# Patient Record
Sex: FEMALE | Race: WHITE | Employment: OTHER | URBAN - METROPOLITAN AREA
[De-identification: names, ages, dates, MRNs, and addresses within clinical notes are randomized per-mention and may not be internally consistent; named-entity substitution may affect disease eponyms.]

---

## 2017-01-11 RX ORDER — DONEPEZIL HYDROCHLORIDE 5 MG/1
TABLET, FILM COATED ORAL
Qty: 30 TAB | Refills: 0 | Status: SHIPPED | OUTPATIENT
Start: 2017-01-11 | End: 2017-03-01 | Stop reason: SDUPTHER

## 2017-03-01 RX ORDER — DONEPEZIL HYDROCHLORIDE 5 MG/1
TABLET, FILM COATED ORAL
Qty: 30 TAB | Refills: 0 | Status: SHIPPED | OUTPATIENT
Start: 2017-03-01 | End: 2017-03-29 | Stop reason: SDUPTHER

## 2017-03-30 RX ORDER — DONEPEZIL HYDROCHLORIDE 5 MG/1
TABLET, FILM COATED ORAL
Qty: 30 TAB | Refills: 0 | Status: SHIPPED | OUTPATIENT
Start: 2017-03-30 | End: 2017-04-21 | Stop reason: ALTCHOICE

## 2017-04-21 ENCOUNTER — OFFICE VISIT (OUTPATIENT)
Dept: NEUROLOGY | Age: 82
End: 2017-04-21

## 2017-04-21 ENCOUNTER — TELEPHONE (OUTPATIENT)
Dept: NEUROLOGY | Age: 82
End: 2017-04-21

## 2017-04-21 VITALS
HEART RATE: 50 BPM | RESPIRATION RATE: 16 BRPM | DIASTOLIC BLOOD PRESSURE: 58 MMHG | HEIGHT: 58 IN | WEIGHT: 110.5 LBS | OXYGEN SATURATION: 97 % | SYSTOLIC BLOOD PRESSURE: 138 MMHG | BODY MASS INDEX: 23.19 KG/M2 | TEMPERATURE: 98.3 F

## 2017-04-21 DIAGNOSIS — N19 RENAL FAILURE: ICD-10-CM

## 2017-04-21 DIAGNOSIS — G30.1 LATE ONSET ALZHEIMER'S DISEASE WITHOUT BEHAVIORAL DISTURBANCE (HCC): Primary | ICD-10-CM

## 2017-04-21 DIAGNOSIS — F02.80 LATE ONSET ALZHEIMER'S DISEASE WITHOUT BEHAVIORAL DISTURBANCE (HCC): Primary | ICD-10-CM

## 2017-04-21 NOTE — MR AVS SNAPSHOT
Visit Information Date & Time Provider Department Dept. Phone Encounter #  
 4/21/2017  3:00 PM Real Mariano NP Neurology sha De La Yangmary ellenie [facility] [phone] [id number] Follow-up Instructions Return in about 4 months (around 8/21/2017). Upcoming Health Maintenance Date Due DTaP/Tdap/Td series (1 - Tdap) 7/5/1955 ZOSTER VACCINE AGE 60> 7/5/1994 GLAUCOMA SCREENING Q2Y 7/5/1999 OSTEOPOROSIS SCREENING (DEXA) 7/5/1999 MEDICARE YEARLY EXAM 7/5/1999 Pneumococcal 65+ High/Highest Risk (2 of 2 - PCV13) 2/11/2014 INFLUENZA AGE 9 TO ADULT 8/1/2016 Allergies as of 4/21/2017  Review Complete On: 4/21/2017 By: Real Mariano NP Severity Noted Reaction Type Reactions Bactrim [Sulfamethoprim Ds]  10/08/2015    Other (comments) Acute Renal Failure Keflex [Cephalexin]  09/24/2011    Hives, Itching Penicillin G  08/23/2015    Rash Current Immunizations  Reviewed on 2/12/2013 Name Date Influenza Vaccine (Quad) PF 10/13/2015 10:27 AM  
 Influenza Vaccine PF 2/11/2013  5:31 PM  
 Pneumococcal Polysaccharide (PPSV-23) 2/11/2013  5:31 PM  
  
 Not reviewed this visit Vitals BP Pulse Temp Resp Height(growth percentile) Weight(growth percentile) 138/58 (!) 50 98.3 °F (36.8 °C) (Oral) 16 4' 10\" (1.473 m) 110 lb 8 oz (50.1 kg) SpO2 BMI OB Status Smoking Status 97% 23.09 kg/m2 Hysterectomy Former Smoker Vitals History BMI and BSA Data Body Mass Index Body Surface Area 23.09 kg/m 2 1.43 m 2 Preferred Pharmacy Pharmacy Name Phone [facility] [address], [address] [facility] [place] [phone] Your Updated Medication List  
  
   
This list is accurate as of: 4/21/17  4:16 PM.  Always use your most recent med list.  
  
  
  
  
 atenolol-chlorthalidone 50-25 mg per tablet Commonly known as:  Colletta Gouty Take 1 Tab by mouth daily. donepezil 10 mg tablet Commonly known as:  ARICEPT  
 Take 1 Tab by mouth nightly. REFRESH LIQUIGEL 1 % Dlgl Generic drug:  carboxymethylcellulose sodium Administer 1 Drop to both eyes as needed. Follow-up Instructions Return in about 4 months (around 8/21/2017). Patient Instructions It is not likely that the Namzaric is causing her to have UTI and while the increase in confusion can be due to the medications, the UTI could also very easily cause this issue as well. With her renal function, we will not restart the Namzaric. It is not uncommon for patients with dementia to think that they are not in their own home because they cannot remember that they moved from a previous home. Introducing [facility] & HEALTH SERVICES! Dear Donald Matters: Thank you for requesting a Vitals [email or web address]) account. Our records indicate that you already have an active Vitals [email or web address]) account. You can access your account anytime at [email or web address] Did you know that you can access your hospital and ER discharge instructions at any time in Vitals [email or web address])? You can also review all of your test results from your hospital stay or ER visit. Additional Information If you have questions, please visit the Frequently Asked Questions section of the Vitals [email or web address]) website at [email or web address] Remember, Vitals ([email or web address]) is NOT to be used for urgent needs. For medical emergencies, dial 911. Now available from your iPhone and Android! Please provide this summary of care documentation to your next provider. Your primary care clinician is listed as [facility]. If you have any questions after today's visit, please call [phone].

## 2017-04-21 NOTE — PROGRESS NOTES
Date:  2017    Name:  Dede Hidalgo  :  1934  MRN:  [id number]     PCP:  Carrol Brown MD    Chief Complaint   Patient presents with    Dementia     poor sleep routine/fair appetite/keeping busy bwith chores and her bird     HISTORY OF PRESENT ILLNESS: Follow up for dementia. She is accompanied by her daughter in law who provides history. Her daughter Andrews Baldwin also sent a message expressing the following concerns. *patient is hiding things in the home and packing her belongings in paperbags. *patient insists that she is not in her home, but wants to go back there. *patient is currently on only Aricept due to an increase in UTIs and agitation on Namzaric. Patient's daughter is not willing to try Namzaric again at this time. Repeating or asking the same thing over and over? Yes   Forgetting appointments, family occasions, holidays? Yes   Needs help managing finances, help shopping, help taking medications correctly? Family helps to do all of these things  Getting lost in familiar places? Does not go out alone and does not drive  Need help with ADLs? Yes, mainly supervision and direction    Unsafe behaviors: Aggression, Wandering-none    Answers provided by:  Family member: grandson's fiance  Other Grandson's fiance's mother       Current Outpatient Prescriptions   Medication Sig    donepezil (ARICEPT) 10 mg tablet Take 1 Tab by mouth nightly.  atenolol-chlorthalidone (TENORETIC) 50-25 mg per tablet Take 1 Tab by mouth daily.  carboxymethylcellulose sodium (REFRESH LIQUIGEL) 1 % dlgl Administer 1 Drop to both eyes as needed. No current facility-administered medications for this visit.       Allergies   Allergen Reactions    Bactrim [Sulfamethoprim Ds] Other (comments)     Acute Renal Failure    Keflex [Cephalexin] Hives and Itching    Penicillin G Rash     Past Medical History:   Diagnosis Date    Anxiety     Arthritis     Asthma     as a child    Depression     Fatigue     GERD (gastroesophageal reflux disease)     Glaucoma     Hypertension     Meniere's disease     Numbness of fingers of both hands     Slurred speech     1/10/13-went to ED    Snoring     Vertigo      Past Surgical History:   Procedure Laterality Date    HX HEENT      esophagus widened with endoscopic    HX HEENT      bilateral cataract removal with lens implants    HX HEENT      glaucoma procedures both eyes    HX HYSTERECTOMY      2010     Social History     Social History    Marital status:      Spouse name: N/A    Number of children: N/A    Years of education: N/A     Occupational History    Not on file. Social History Main Topics    Smoking status: Former Smoker     Quit date: 1/1/1989    Smokeless tobacco: Not on file    Alcohol use No    Drug use: No    Sexual activity: Not Currently     Other Topics Concern    Not on file     Social History Narrative     Family History   Problem Relation Age of Onset    Dementia Mother     Hypertension Father     Parkinson's Disease Maternal Grandmother        PHYSICAL EXAMINATION:    Visit Vitals    /58    Pulse (!) 50    Temp 98.3 °F (36.8 °C) (Oral)    Resp 16    Ht 4' 10\" (1.473 m)    Wt 50.1 kg (110 lb 8 oz)    SpO2 97%    BMI 23.09 kg/m2     General: Well defined, nourished, and groomed individual in no acute distress. Neck: Supple, nontender, no bruits, no pain with resistance to active range of motion. Heart: Regular rate and rhythm, no murmurs, rub, or gallop. Normal S1S2. Lungs: Clear to auscultation bilaterally with equal chest expansion, no cough, no wheeze  Musculoskeletal: Extremities revealed no edema and had full range of motion of joints. Psych: Good mood and bright affect     NEUROLOGICAL EXAMINATION:   Mental Status: Alert and oriented to person and place. She is appropriate and able to discuss some of her medical conditions.  However, she does have some decrease in insight.       Cranial Nerves:   II, III, IV, VI: Visual acuity grossly intact. Visual fields are normal.   Pupils are equal, round, and reactive to light and accommodation. Extra-ocular movements are full and fluid. Fundoscopic exam was benign, no ptosis or nystagmus. V-XII: Hearing is grossly intact. Facial features are symmetric, with normal sensation and strength. The palate rises symmetrically and the tongue protrudes midline. Sternocleidomastoids 5/5.      Motor Examination: Normal tone, bulk, and strength, 5/5 muscle strength throughout.      Coordination: Finger to nose was normal. No resting or intention tremor     Gait and Station: Steady while walking. Normal arm swing. No pronator drift. No muscle wasting or fasiculations noted.      Reflexes: DTRs 2+ throughout. ASSESSMENT AND PLAN    ICD-10-CM ICD-9-CM    1. Late onset Alzheimer's disease without behavioral disturbance G30.1 331.0     F02.80 294.10    2. Renal failure N19 586      Some of the issues with putting things away, packing her items, and wanting to go home because she does not always recognize where she currently lives as her home, is consistent with an advancing dementia process. I discussed the diagnosis, prognosis, and progression with the caregivers who brought her today and recommended reorientation if possible followed by redirection if not. We also discussed the preference for dual therapy for maintenance but given her kidney issues will hold off on the Namenda as this is not recommended in severe renal impairment. She will continue with the Aricept. Encouraged them to try to keep her as mentally and physically active as possible. In discussing her disease progression, we did discuss the reality that leaving her at home alone at night will become unreasonable. She is already displaying some confusion in that she has been known to call her grandson in the middle of the night. Apparently, this is a discussion that the family has started to have.  Follow up in four months    [address] [facility].  Karen Thompson

## 2017-04-21 NOTE — TELEPHONE ENCOUNTER
Patient's daughter calls to report that the patient will be brought to her appointment today by her future daughter in law. Daughter, Clarisa Curtis, verbalizes some new concerns:  *patient is hiding things in the home and packing her belongings in paperbags. *patient insists that she is not in her home, but wants to go back there. *patient is currently on only Aricept due to an increase in UTIs and agitation on Namzaric. Patient's daughter is not willing to try Namzaric again at this time.

## 2017-04-21 NOTE — PATIENT INSTRUCTIONS
It is not likely that the Namzaric is causing her to have UTI and while the increase in confusion can be due to the medications, the UTI could also very easily cause this issue as well. With her renal function, we will not restart the Namzaric. It is not uncommon for patients with dementia to think that they are not in their own home because they cannot remember that they moved from a previous home.

## 2017-04-21 NOTE — TELEPHONE ENCOUNTER
Laura Enamorado requesting call back from you before pt appt ricky please #[phone] ex [phone].  Thank you

## 2017-04-25 ENCOUNTER — HOSPITAL ENCOUNTER (EMERGENCY)
Age: 82
Discharge: HOME | End: 2017-04-25
Attending: EMERGENCY MEDICINE
Payer: MEDICARE

## 2017-04-25 VITALS
HEART RATE: 54 BPM | DIASTOLIC BLOOD PRESSURE: 72 MMHG | HEIGHT: 59 IN | OXYGEN SATURATION: 95 % | TEMPERATURE: 98.5 F | RESPIRATION RATE: 18 BRPM | SYSTOLIC BLOOD PRESSURE: 150 MMHG | BODY MASS INDEX: 22.58 KG/M2 | WEIGHT: 112 LBS

## 2017-04-25 DIAGNOSIS — M79.89 LEFT LEG SWELLING: Primary | ICD-10-CM

## 2017-04-25 DIAGNOSIS — E86.0 DEHYDRATION: ICD-10-CM

## 2017-04-25 DIAGNOSIS — N30.00 ACUTE CYSTITIS WITHOUT HEMATURIA: ICD-10-CM

## 2017-04-25 DIAGNOSIS — N28.9 RENAL INSUFFICIENCY: ICD-10-CM

## 2017-04-25 LAB
ALBUMIN SERPL BCP-MCNC: 3.2 G/DL (ref 3.5–5)
ALBUMIN/GLOB SERPL: 0.7 {RATIO} (ref 1.1–2.2)
ALP SERPL-CCNC: 82 U/L (ref 45–117)
ALT SERPL-CCNC: 15 U/L (ref 12–78)
ANION GAP BLD CALC-SCNC: 7 MMOL/L (ref 5–15)
APPEARANCE UR: ABNORMAL
AST SERPL W P-5'-P-CCNC: 9 U/L (ref 15–37)
BACTERIA URNS QL MICRO: ABNORMAL /HPF
BASOPHILS # BLD AUTO: 0 K/UL (ref 0–0.1)
BASOPHILS # BLD: 0 % (ref 0–1)
BILIRUB SERPL-MCNC: 0.5 MG/DL (ref 0.2–1)
BILIRUB UR QL: NEGATIVE
BUN SERPL-MCNC: 46 MG/DL (ref 6–20)
BUN/CREAT SERPL: 24 (ref 12–20)
CALCIUM SERPL-MCNC: 9.6 MG/DL (ref 8.5–10.1)
CHLORIDE SERPL-SCNC: 108 MMOL/L (ref 97–108)
CO2 SERPL-SCNC: 29 MMOL/L (ref 21–32)
COLOR UR: ABNORMAL
CREAT SERPL-MCNC: 1.9 MG/DL (ref 0.55–1.02)
EOSINOPHIL # BLD: 0.3 K/UL (ref 0–0.4)
EOSINOPHIL NFR BLD: 3 % (ref 0–7)
EPITH CASTS URNS QL MICRO: ABNORMAL /LPF
ERYTHROCYTE [DISTWIDTH] IN BLOOD BY AUTOMATED COUNT: 16.5 % (ref 11.5–14.5)
GLOBULIN SER CALC-MCNC: 4.6 G/DL (ref 2–4)
GLUCOSE SERPL-MCNC: 101 MG/DL (ref 65–100)
GLUCOSE UR STRIP.AUTO-MCNC: NEGATIVE MG/DL
HCT VFR BLD AUTO: 37.7 % (ref 35–47)
HGB BLD-MCNC: 12.1 G/DL (ref 11.5–16)
HGB UR QL STRIP: ABNORMAL
KETONES UR QL STRIP.AUTO: NEGATIVE MG/DL
LEUKOCYTE ESTERASE UR QL STRIP.AUTO: ABNORMAL
LYMPHOCYTES # BLD AUTO: 26 % (ref 12–49)
LYMPHOCYTES # BLD: 2.7 K/UL (ref 0.8–3.5)
MCH RBC QN AUTO: 29 PG (ref 26–34)
MCHC RBC AUTO-ENTMCNC: 32.1 G/DL (ref 30–36.5)
MCV RBC AUTO: 90.4 FL (ref 80–99)
MONOCYTES # BLD: 0.8 K/UL (ref 0–1)
MONOCYTES NFR BLD AUTO: 8 % (ref 5–13)
NEUTS SEG # BLD: 6.4 K/UL (ref 1.8–8)
NEUTS SEG NFR BLD AUTO: 63 % (ref 32–75)
NITRITE UR QL STRIP.AUTO: NEGATIVE
PH UR STRIP: 6.5 [PH] (ref 5–8)
PLATELET # BLD AUTO: 247 K/UL (ref 150–400)
POTASSIUM SERPL-SCNC: 4.3 MMOL/L (ref 3.5–5.1)
PROT SERPL-MCNC: 7.8 G/DL (ref 6.4–8.2)
PROT UR STRIP-MCNC: 100 MG/DL
RBC # BLD AUTO: 4.17 M/UL (ref 3.8–5.2)
RBC #/AREA URNS HPF: >100 /HPF (ref 0–5)
SODIUM SERPL-SCNC: 144 MMOL/L (ref 136–145)
SP GR UR REFRACTOMETRY: 1.02 (ref 1–1.03)
UROBILINOGEN UR QL STRIP.AUTO: 0.2 EU/DL (ref 0.2–1)
WBC # BLD AUTO: 10.1 K/UL (ref 3.6–11)
WBC URNS QL MICRO: >100 /HPF (ref 0–4)

## 2017-04-25 PROCEDURE — 87086 URINE CULTURE/COLONY COUNT: CPT | Performed by: EMERGENCY MEDICINE

## 2017-04-25 PROCEDURE — 36415 COLL VENOUS BLD VENIPUNCTURE: CPT | Performed by: EMERGENCY MEDICINE

## 2017-04-25 PROCEDURE — 81001 URINALYSIS AUTO W/SCOPE: CPT | Performed by: EMERGENCY MEDICINE

## 2017-04-25 PROCEDURE — 93971 EXTREMITY STUDY: CPT

## 2017-04-25 PROCEDURE — 85025 COMPLETE CBC W/AUTO DIFF WBC: CPT | Performed by: EMERGENCY MEDICINE

## 2017-04-25 PROCEDURE — 51798 US URINE CAPACITY MEASURE: CPT

## 2017-04-25 PROCEDURE — 80053 COMPREHEN METABOLIC PANEL: CPT | Performed by: EMERGENCY MEDICINE

## 2017-04-25 PROCEDURE — 99283 EMERGENCY DEPT VISIT LOW MDM: CPT

## 2017-04-25 NOTE — ED TRIAGE NOTES
Triage Note: Pt coming to ED escorted by family for LEFT leg swelling, urinary frequency, and fatigue x 4 days; Family states pt fell asleep at the table during breakfast; Hx of Stage IV Kidney Disease;  Pt states she has been using the restroom a lot but does not feel like she is fully emptying her bladder and describes her abdomen as 'full'

## 2017-04-25 NOTE — ED NOTES
Bedside and Verbal shift change report given to Hayley Correa RN (oncoming nurse) by Wendy Garber RN (offgoing nurse). Report included the following information ED Summary.

## 2017-04-25 NOTE — ED NOTES
Pt currently in [address].   Will wait for patient's return and assume care of patient

## 2017-04-26 NOTE — DISCHARGE INSTRUCTIONS
We hope that we have addressed all of your medical concerns. The examination and treatment you received in the Emergency Department were for an emergent problem and were not intended as complete care. It is important that you follow up with your healthcare provider(s) for ongoing care. If your symptoms worsen or do not improve as expected, and you are unable to reach your usual health care provider(s), you should return to the Emergency Department. Today's healthcare is undergoing tremendous change, and patient satisfaction surveys are one of the many tools to assess the quality of medical care. You may receive a survey from the [employer] regarding your experience in the Emergency Department. I hope that your experience has been completely positive, particularly the medical care that I provided. As such, please participate in the survey; anything less than excellent does not meet my expectations or intentions. [facility] and [address] [place] [address] participate in nationally recognized quality of care measures. If your blood pressure is greater than 120/80, as reported below, we urge that you seek medical care to address the potential of high blood pressure, commonly known as hypertension. Hypertension can be hereditary or can be caused by certain medical conditions, pain, stress, or \"white coat syndrome. \"       Please make an appointment with your health care provider(s) for follow up of your Emergency Department visit. VITALS:   Patient Vitals for the past 8 hrs:   Temp Pulse Resp BP SpO2   04/25/17 1823 98.5 °F (36.9 °C) (!) 55 18 137/60 95 %          Thank you for allowing us to provide you with medical care today. We realize that you have many choices for your emergency care needs. Please choose us in the future for any continued health care needs.       Regards,           Alan Bell, DO    [employer] Office: [phone]            Recent Results (from the past 24 hour(s))   CBC WITH AUTOMATED DIFF    Collection Time: 04/25/17  6:35 PM   Result Value Ref Range    WBC 10.1 3.6 - 11.0 K/uL    RBC 4.17 3.80 - 5.20 M/uL    HGB 12.1 11.5 - 16.0 g/dL    HCT 37.7 35.0 - 47.0 %    MCV 90.4 80.0 - 99.0 FL    MCH 29.0 26.0 - 34.0 PG    MCHC 32.1 30.0 - 36.5 g/dL    RDW 16.5 (H) 11.5 - 14.5 %    PLATELET 726 868 - 820 K/uL    NEUTROPHILS 63 32 - 75 %    LYMPHOCYTES 26 12 - 49 %    MONOCYTES 8 5 - 13 %    EOSINOPHILS 3 0 - 7 %    BASOPHILS 0 0 - 1 %    ABS. NEUTROPHILS 6.4 1.8 - 8.0 K/UL    ABS. LYMPHOCYTES 2.7 0.8 - 3.5 K/UL    ABS. MONOCYTES 0.8 0.0 - 1.0 K/UL    ABS. EOSINOPHILS 0.3 0.0 - 0.4 K/UL    ABS. BASOPHILS 0.0 0.0 - 0.1 K/UL   METABOLIC PANEL, COMPREHENSIVE    Collection Time: 04/25/17  6:35 PM   Result Value Ref Range    Sodium 144 136 - 145 mmol/L    Potassium 4.3 3.5 - 5.1 mmol/L    Chloride 108 97 - 108 mmol/L    CO2 29 21 - 32 mmol/L    Anion gap 7 5 - 15 mmol/L    Glucose 101 (H) 65 - 100 mg/dL    BUN 46 (H) 6 - 20 MG/DL    Creatinine 1.90 (H) 0.55 - 1.02 MG/DL    BUN/Creatinine ratio 24 (H) 12 - 20      GFR est AA 31 (L) >60 ml/min/1.73m2    GFR est non-AA 25 (L) >60 ml/min/1.73m2    Calcium 9.6 8.5 - 10.1 MG/DL    Bilirubin, total 0.5 0.2 - 1.0 MG/DL    ALT (SGPT) 15 12 - 78 U/L    AST (SGOT) 9 (L) 15 - 37 U/L    Alk.  phosphatase 82 45 - 117 U/L    Protein, total 7.8 6.4 - 8.2 g/dL    Albumin 3.2 (L) 3.5 - 5.0 g/dL    Globulin 4.6 (H) 2.0 - 4.0 g/dL    A-G Ratio 0.7 (L) 1.1 - 2.2     URINALYSIS W/MICROSCOPIC    Collection Time: 04/25/17  6:35 PM   Result Value Ref Range    Color YELLOW/STRAW      Appearance TURBID (A) CLEAR      Specific gravity 1.016 1.003 - 1.030      pH (UA) 6.5 5.0 - 8.0      Protein 100 (A) NEG mg/dL    Glucose NEGATIVE  NEG mg/dL    Ketone NEGATIVE  NEG mg/dL    Bilirubin NEGATIVE  NEG      Blood LARGE (A) NEG      Urobilinogen 0.2 0.2 - 1.0 EU/dL    Nitrites NEGATIVE  NEG Leukocyte Esterase LARGE (A) NEG      WBC >100 (H) 0 - 4 /hpf    RBC >100 (H) 0 - 5 /hpf    Epithelial cells FEW FEW /lpf    Bacteria 1+ (A) NEG /hpf       No results found. Oral Rehydration: Care Instructions  Your Care Instructions  Dehydration occurs when your body loses too much water. This can happen if you do not drink enough fluids or lose a lot of fluid due to diarrhea, vomiting, or sweating. Being dehydrated can cause health problems and can even be life-threatening. To replace lost fluids, you need to drink liquid that contains special chemicals called electrolytes. Electrolytes keep your body working well. Plain water does not have electrolytes. You also need to rest to prevent more fluid loss. Replacing water and electrolytes (oral rehydration) completely takes about 36 hours. But you should feel better within a few hours. Follow-up care is a key part of your treatment and safety. Be sure to make and go to all appointments, and call your doctor if you are having problems. It's also a good idea to know your test results and keep a list of the medicines you take. How can you care for yourself at home? · Take frequent sips of a drink such as Gatorade, Powerade, or other rehydration drinks that your doctor suggests. These replace both fluid and important chemicals (electrolytes) you need for balance in your blood. · Drink 2 quarts of cool liquid over 2 to 4 hours. You should have at least 10 glasses of liquid a day to replace lost fluid. If you have kidney, heart, or liver disease and have to limit fluids, talk with your doctor before you increase the amount of fluids you drink. · Make your own drink. Measure everything carefully. The drink may not work well or may even be harmful if the amounts are off. ¨ 1 quart water  ¨ ½ teaspoon salt  ¨ 6 teaspoons sugar  · Do not drink liquid with caffeine, such as coffee and dixie. · Do not drink any alcohol. It can make you dehydrated.   · Drink plenty of fluids, enough so that your urine is light yellow or clear like water. If you have kidney, heart, or liver disease and have to limit fluids, talk with your doctor before you increase the amount of fluids you drink. When should you call for help? Call 911 anytime you think you may need emergency care. For example, call if:  · You have signs of severe dehydration, such as:  ¨ You are confused or unable to stay awake. ¨ You passed out (lost consciousness). Call your doctor now or seek immediate medical care if:  · You still have signs of dehydration. You have sunken eyes and a dry mouth, and you pass only a little dark urine. · You are dizzy or lightheaded, or you feel like you may faint. · You are not able to keep down fluids. Watch closely for changes in your health, and be sure to contact your doctor if:  · You do not get better as expected. Where can you learn more? Go to http://sabina-ger.info/. Enter I040 in the search box to learn more about \"Oral Rehydration: Care Instructions. \"  Current as of: May 27, 2016  Content Version: 11.2  © 8631-8111 [employer]. Care instructions adapted under license by [employer] (which disclaims liability or warranty for this information). If you have questions about a medical condition or this instruction, always ask your healthcare professional. [address] any warranty or liability for your use of this information. Dehydration: Care Instructions  Your Care Instructions  Dehydration happens when your body loses too much fluid. This might happen when you do not drink enough water or you lose large amounts of fluids from your body because of diarrhea, vomiting, or sweating. Severe dehydration can be life-threatening. Water and minerals called electrolytes help put your body fluids back in balance.  Learn the early signs of fluid loss, and drink more fluids to prevent dehydration. Follow-up care is a key part of your treatment and safety. Be sure to make and go to all appointments, and call your doctor if you are having problems. It's also a good idea to know your test results and keep a list of the medicines you take. How can you care for yourself at home? · To prevent dehydration, drink plenty of fluids, enough so that your urine is light yellow or clear like water. Choose water and other caffeine-free clear liquids until you feel better. If you have kidney, heart, or liver disease and have to limit fluids, talk with your doctor before you increase the amount of fluids you drink. · If you do not feel like eating or drinking, try taking small sips of water, sports drinks, or other rehydration drinks. · Get plenty of rest.  To prevent dehydration  · Add more fluids to your diet and daily routine, unless your doctor has told you not to. · During hot weather, drink more fluids. Drink even more fluids if you exercise a lot. Stay away from drinks with alcohol or caffeine. · Watch for the symptoms of dehydration. These include:  ¨ A dry, sticky mouth. ¨ Dark yellow urine, and not much of it. ¨ Dry and sunken eyes. ¨ Feeling very tired. · Learn what problems can lead to dehydration. These include:  ¨ Diarrhea, fever, and vomiting. ¨ Any illness with a fever, such as pneumonia or the flu. ¨ Activities that cause heavy sweating, such as endurance races and heavy outdoor work in hot or humid weather. ¨ Alcohol or drug abuse or withdrawal.  ¨ Certain medicines, such as cold and allergy pills (antihistamines), diet pills (diuretics), and laxatives. ¨ Certain diseases, such as diabetes, cancer, and heart or kidney disease. When should you call for help? Call 911 anytime you think you may need emergency care. For example, call if:  · You passed out (lost consciousness).   Call your doctor now or seek immediate medical care if:  · You are confused and cannot think clearly. · You are dizzy or lightheaded, or you feel like you may faint. · You have signs of needing more fluids. You have sunken eyes and a dry mouth, and you pass only a little dark urine. · You cannot keep fluids down. Watch closely for changes in your health, and be sure to contact your doctor if:  · You are not making tears. · Your skin is very dry and sags slowly back into place after you pinch it. · Your mouth and eyes are very dry. Where can you learn more? Go to http://sabina-ger.info/. Enter M165 in the search box to learn more about \"Dehydration: Care Instructions. \"  Current as of: May 27, 2016  Content Version: 11.2  © 3758-4017 [employer]. Care instructions adapted under license by [employer] (which disclaims liability or warranty for this information). If you have questions about a medical condition or this instruction, always ask your healthcare professional. [place] [address] any warranty or liability for your use of this information.

## 2017-04-26 NOTE — PROCEDURES
[address]  *** FINAL REPORT ***    Name: Lexy Leiva  MRN: [id number]  : 1934  HIS Order #: [id number]  [address] [facility] Visit #: [id number]  Date: 2017    TYPE OF TEST: Peripheral Venous Testing    REASON FOR TEST  Edema    Left Leg:-  Deep venous thrombosis:           No  Superficial venous thrombosis:    No  Deep venous insufficiency:        Not examined  Superficial venous insufficiency: Not examined      INTERPRETATION/FINDINGS  PROCEDURE:  Color duplex ultrasound imaging of lower extremity veins. FINDINGS:       Right: The common femoral vein is patent and without evidence of  thrombus. Phasic flow is observed. This extremity was not otherwise  evaluated. Left:   The common femoral, deep femoral, femoral, popliteal,  posterior tibial, peroneal, and great saphenous are patent and without   evidence of thrombus;  each is fully compressible and there is no  narrowing of the flow channel on color Doppler imaging. Phasic flow  is observed in the common femoral vein. IMPRESSION:  No evidence of left lower extremity vein thrombosis. There   is an incidental finding of a prominent left groin lymph node. ADDITIONAL COMMENTS    I have personally reviewed the data relevant to the interpretation of  this  study.     TECHNOLOGIST: Raghav Parrish RVT  Signed: 2017 08:45 PM    PHYSICIAN: David Francis MD  Signed: 2017 06:55 AM

## 2017-04-26 NOTE — ED PROVIDER NOTES
HPI Comments: 80 y.o. female with past medical history significant for vertigo, HTN, arthritis, asthma, GERD, glaucoma, Meniere's disease, slurred speech, numbness of finders of both hands, depression, anxiety, fatigue, snoring and kidney disease stage IV who presents from home with chief complaint of leg swelling. Per relative, the pt has hx of chronic UTI with reccurrence usually occurring every month (ongoing past 1.5 years). She reports that the pt last received evaluation for a UTI one month ago which was negative. Per relative, the pt experienced onset of bilateral LE swelling on Friday (4/21/2017) with noticeable increase in swelling on Sunday (4/23/2017). Today (4/25/2017) the relative reports that the pt's bilateral LE felt hot to touch with swelling more so to the left vs right. Per pt, she has hx of swelling in the past or hx of blood clot. Her relative adds that the pt has also been experiencing frequency with 6x urination occurring in a thirty minute time period. They add that this frequency is associated with urgency. The relative makes it known that the pt often sleeps at night in a recliner, yet it is not due to breathing related issues. She denies known hx of SOB with exertion. The pt further denies, fever, chills, cough with blood and N/V/D. There are no other acute medical concerns at this time. Old Chart: October 13, 2015 pt's creatinine read 1.43      Social hx: Former smoker, No ETOH use    PCP: Jeanette Florian MD    Note written by Yunior Stark, as dictated by No att. providers found 7:22 PM          The history is provided by the patient and a relative.         Past Medical History:   Diagnosis Date    Anxiety     Arthritis     Asthma     as a child    Depression     Fatigue     GERD (gastroesophageal reflux disease)     Glaucoma     Hypertension     Kidney disease 2016    Stage IV    Meniere's disease     Numbness of fingers of both hands     Slurred speech 1/10/13-went to ED    Snoring     Vertigo        Past Surgical History:   Procedure Laterality Date    HX HEENT      esophagus widened with endoscopic    HX HEENT      bilateral cataract removal with lens implants    HX HEENT      glaucoma procedures both eyes    HX HYSTERECTOMY      2010         Family History:   Problem Relation Age of Onset    Dementia Mother     Hypertension Father     Parkinson's Disease Maternal Grandmother        Social History     Social History    Marital status:      Spouse name: N/A    Number of children: N/A    Years of education: N/A     Occupational History    Not on file. Social History Main Topics    Smoking status: Former Smoker     Quit date: 1/1/1989    Smokeless tobacco: Not on file    Alcohol use No    Drug use: No    Sexual activity: Not Currently     Other Topics Concern    Not on file     Social History Narrative         ALLERGIES: Bactrim [sulfamethoprim ds]; Keflex [cephalexin]; and Penicillin g    Review of Systems   Constitutional: Negative for chills and fever. Respiratory: Negative for cough and shortness of breath. Gastrointestinal: Negative for diarrhea, nausea and vomiting. Genitourinary: Positive for frequency (6x urination within 30 minutes today (4/25/2017)) and urgency. Musculoskeletal:        Bilateral LE swelling more so on left vs right since Friday (4/21/2017)   All other systems reviewed and are negative. Vitals:    04/25/17 1823 04/25/17 2158   BP: 137/60 150/72   Pulse: (!) 55 (!) 54   Resp: 18 18   Temp: 98.5 °F (36.9 °C) 98.5 °F (36.9 °C)   SpO2: 95% 95%   Weight: 50.8 kg (112 lb)    Height: 4' 11\" (1.499 m)             Physical Exam      Constitutional: Pt is awake and alert. Pt appears well-developed and well-nourished. NAD. HENT:   Head: Normocephalic and atraumatic. Nose: Nose normal.   Mouth/Throat: Oropharynx is clear and moist. No oropharyngeal exudate.    Eyes: Conjunctivae and extraocular motions are normal. Pupils are round, and reactive to light. Right eye exhibits no discharge. Left eye exhibits no discharge. No scleral icterus (Surgical pupil to left measuring in 9 mm and fixed. Right pupil elliptical, measuring 3 mm). Neck: No tracheal deviation present. Supple neck. Cardiovascular: Normal rate, regular rhythm, normal heart sounds and intact distal pulses. Exam reveals no gallop and no friction rub. No murmur heard. Pulmonary/Chest: Effort normal and breath sounds normal.  Pt  has no wheezes. Pt  has no rales. Abdominal: Soft. Pt  exhibits no distension and no mass. No tenderness. Pt  has no rebound and no guarding. Musculoskeletal:  Pt  Exhibits edema to bilateral LE with more swelling to left vs right) and no tenderness. Ext: Normal ROM in all four extremities; not tender to palpation; distal pulses are normal.   Neurological:  Pt is alert. nonfocal neuro exam.  Skin: Skin is warm and dry. Pt  is not diaphoretic. (abrassion to right hand and mild bruising to base of left middle finger). Psychiatric:  Pt  has a normal mood and affect. Behavior is normal.     Note written by Yunior Jose, as dictated by Jolanta Nolen DO 7:08 PM          [facility]  ED Course       Procedures      PROGRESS NOTE:  9:55 PM     Went over results with pt and family. The family reports pt does not drink enough water. WIll check on pt and have another BMP with PCP follow up in two days.         Labs Reviewed   CBC WITH AUTOMATED DIFF - Abnormal; Notable for the following:        Result Value    RDW 16.5 (*)     All other components within normal limits   METABOLIC PANEL, COMPREHENSIVE - Abnormal; Notable for the following:     Glucose 101 (*)     BUN 46 (*)     Creatinine 1.90 (*)     BUN/Creatinine ratio 24 (*)     GFR est AA 31 (*)     GFR est non-AA 25 (*)     AST (SGOT) 9 (*)     Albumin 3.2 (*)     Globulin 4.6 (*)     A-G Ratio 0.7 (*)     All other components within normal limits   URINALYSIS W/MICROSCOPIC - Abnormal; Notable for the following:     Appearance TURBID (*)     Protein 100 (*)     Blood LARGE (*)     Leukocyte Esterase LARGE (*)     WBC >100 (*)     RBC >100 (*)     Bacteria 1+ (*)     All other components within normal limits   CULTURE, URINE   SAMPLES BEING HELD     Has multiple abx allergies. Awaiting urine cx.

## 2017-04-26 NOTE — ED NOTES
Assumed care of patient for discharge only. Pt has no real complaints. Multiple family members at bedside speaking with Dr. Clinton Singh. Given discharge instructions. VSS. Left ambulatory with family members and all belongings.   Removed 20G IV from RIGHT forearm prior to discharge with tip intact

## 2017-04-27 LAB
BACTERIA SPEC CULT: NORMAL
CC UR VC: NORMAL
SERVICE CMNT-IMP: NORMAL

## 2017-05-04 ENCOUNTER — TELEPHONE (OUTPATIENT)
Dept: NEUROLOGY | Age: 82
End: 2017-05-04

## 2017-05-04 NOTE — TELEPHONE ENCOUNTER
----- Message from Professor Migue Jackson sent at 5/4/2017 11:27 AM EDT -----  Regarding: Dr. Radha Galo [facility], [phone]) is requesting to speak with the dr to discuss the pt's treatment. He did not provided any additional details. Please contact Urszula Greer on the matter.

## 2017-05-04 NOTE — TELEPHONE ENCOUNTER
Returned the call to  Mr Tommy Porter. Unfortunately I had to leave a message for him to give us a call back.

## 2017-05-23 RX ORDER — DONEPEZIL HYDROCHLORIDE 5 MG/1
TABLET, FILM COATED ORAL
Qty: 30 TAB | Refills: 0 | Status: SHIPPED | OUTPATIENT
Start: 2017-05-23 | End: 2017-07-01 | Stop reason: SDUPTHER

## 2017-06-16 ENCOUNTER — TELEPHONE (OUTPATIENT)
Dept: NEUROLOGY | Age: 82
End: 2017-06-16

## 2017-06-16 NOTE — TELEPHONE ENCOUNTER
----- Message from Raghav Veronica sent at 6/16/2017 12:57 PM EDT -----  Regarding: Jose/Telephone  Roro Perez pt's daughter called requesting a letter stating that the pt cannot drive or some type of reference stating pt cannot drive. Pt daughter Sam Prince number is [phone] ext [phone].

## 2017-06-19 NOTE — TELEPHONE ENCOUNTER
p's daughter would like to have call back from you please regarding to letter stating that pt can not drive. #[phone] ext [phone].  Thank you

## 2017-07-03 RX ORDER — DONEPEZIL HYDROCHLORIDE 5 MG/1
TABLET, FILM COATED ORAL
Qty: 30 TAB | Refills: 0 | Status: SHIPPED | OUTPATIENT
Start: 2017-07-03 | End: 2017-07-26 | Stop reason: SDUPTHER

## 2017-07-26 RX ORDER — DONEPEZIL HYDROCHLORIDE 5 MG/1
TABLET, FILM COATED ORAL
Qty: 30 TAB | Refills: 0 | Status: SHIPPED | OUTPATIENT
Start: 2017-07-26

## 2017-08-21 ENCOUNTER — TELEPHONE (OUTPATIENT)
Dept: NEUROLOGY | Age: 82
End: 2017-08-21